# Patient Record
(demographics unavailable — no encounter records)

---

## 2025-07-30 NOTE — ASSESSMENT
[FreeTextEntry1] : Patient presents today for preoperative evaluation prior to undergoing panniculectomy with Dr. Farrukh Hyman to assess her Lap-Band for possible removal versus repositioning of the port at the time of her procedure.  Video Esophagram Performed Today Demonstrates Realize band in appropriate orientation without evidence of prolapse or obstruction.  There is no significant reflux, tertiary contractions of the esophagus.  The aperture of the band appears widely patent.  Contrast traverses the band smoothly without suggestion of erosion.  LAP-BAND adjustment was accessed.  Port easily palpable in the left upper quadrant.  Using standard sterile technique, a 20-gauge Douglass needle was inserted into the LAP-BAND port.  This was accessed with single puncture.  1 cc were aspirated and 1 cc removed. The resulting net volume remains 0 cc in preparation for general anesthesia.  Routine post adjustment nutritional counseling was provided. This patient should adhere to a liquid diet for the next 24-48 hours advancing one stage per day to regular as tolerated.  Nutritional and weight loss counseling has been provided. The importance of weight reduction in the treatment of Obesity and its contribution to resolution of obesity related comorbidities has been discussed.  The patient is encouraged to remain calorie conscious and continue a low fat, low carbohydrate, high protein diet. Eat slowly and chew food well.  Avoid eating and drinking simultaneously.  Also, emphasis has been placed on the importance of adequate hydration, multi-vitamin supplementation and exercise.  (15 min)  I have discussed with the patient the findings above and demonstrated the imaging.  There is no clear indications that the Lap-Band needs to be removed.  I would certainly not recommend Lap-Band removal simultaneously with the panniculectomy for risks of infection complications in the event the band is found to be eroded or an enterotomy of the stomach is encountered at the time of band removal.  As such I have recommended and offered the patient lap band repositioning so that it does not interfere with and or remains easily accessible following of the panniculectomy.  I have also made clear to her that the realize band is no longer manufactured and replacement parts for this device would not be available.  In the event of tubing malfunction, fracture or disruption the port may need to be removed at the time of the panniculectomy with subsequent removal of the Lap-Band at a later time.  Patient understands.  All questions have been answered.  She is agreeable to proceed with panniculectomy and repositioning (or removal of) the Lap-Band port.  Routine presurgical testing to be arranged by Dr. Hyman's office.  I will make myself available day of surgery for intraoperative evaluation and attention to the Lap-Band port and tubing.  As per anesthesia guidelines the patient has been reminded that she will need to withhold her Zepbound for at least 2 weeks prior to surgery.  Follow-up with me postoperatively

## 2025-07-30 NOTE — PHYSICAL EXAM
[Normal Breath Sounds] : Normal breath sounds [Normal Heart Sounds] : normal heart sounds [Normal Rate and Rhythm] : normal rate and rhythm [Alert] : alert [Oriented to Person] : oriented to person [Oriented to Place] : oriented to place [Oriented to Time] : oriented to time [Calm] : calm [de-identified] : Healthy-appearing obese woman in no acute distress [de-identified] : NCAT, PERRLA, EOMI.  No scleral icterus or jaundice. [de-identified] : morbidly obese, soft, nontender, nondistended, positive bowel sounds in all 4 quadrants. No evidence of hernia or masses. Surgical incisions were well approximated and healed appropriately without erythema, induration or fluctuance. LAP-BAND port is easily palpable without tenderness.  [de-identified] : Ambulating without difficulty or assistance [de-identified] : Pendulous abdominal pannus

## 2025-07-30 NOTE — HISTORY OF PRESENT ILLNESS
[de-identified] : 45-year-old woman with a history of morbid obesity status post laparoscopic adjustable gastric band (realize) placed by Dr. Cohen at [facility] in [place] in 2011.  Patient states she never underwent any significant band adjustments and her weight loss was only moderate.  She is presently losing weight on Zepbound and is planning to undergo abdominoplasty with Dr. Farrukh Hyman in September 2025.  She is referred to our office in consultation to evaluate the Lap-Band for potential band removal or repositioning at the time of her panniculectomy.  Patient denies any adverse events or symptoms.  No history of dysphagia, reflux, nauseousness, vomiting.  She denies any food impactions.  She denies any erythema or significant tenderness to the Lap-Band port site.

## 2025-07-30 NOTE — CONSULT LETTER
[Dear  ___] : Dear  [unfilled], [Courtesy Letter:] : I had the pleasure of seeing your patient, [unfilled], in my office today. [Please see my note below.] : Please see my note below. [Consult Closing:] : Thank you very much for allowing me to participate in the care of this patient.  If you have any questions, please do not hesitate to contact me. [Sincerely,] : Sincerely, [FreeTextEntry3] : Norberto Bhatti MD, FACS, [facility] Chair, Dept of Surgery, [facility] [address] [address] P [phone]